# Patient Record
Sex: FEMALE | Race: WHITE | Employment: FULL TIME | URBAN - METROPOLITAN AREA
[De-identification: names, ages, dates, MRNs, and addresses within clinical notes are randomized per-mention and may not be internally consistent; named-entity substitution may affect disease eponyms.]

---

## 2018-11-18 ENCOUNTER — HOSPITAL ENCOUNTER (OUTPATIENT)
Age: 58
Setting detail: OBSERVATION
Discharge: HOME | DRG: 149 | End: 2018-11-19
Attending: EMERGENCY MEDICINE | Admitting: INTERNAL MEDICINE
Payer: COMMERCIAL

## 2018-11-18 ENCOUNTER — APPOINTMENT (OUTPATIENT)
Dept: CT IMAGING | Age: 58
DRG: 149 | End: 2018-11-18
Payer: COMMERCIAL

## 2018-11-18 DIAGNOSIS — R42 DIZZINESS: Primary | ICD-10-CM

## 2018-11-18 LAB
ANION GAP SERPL CALCULATED.3IONS-SCNC: 10 MMOL/L (ref 3–16)
APTT: 29.6 SEC (ref 26–36)
BUN BLDV-MCNC: 14 MG/DL (ref 7–20)
CALCIUM SERPL-MCNC: 8.9 MG/DL (ref 8.3–10.6)
CHLORIDE BLD-SCNC: 102 MMOL/L (ref 99–110)
CHP ED QC CHECK: YES
CO2: 26 MMOL/L (ref 21–32)
CREAT SERPL-MCNC: 0.9 MG/DL (ref 0.6–1.2)
GFR AFRICAN AMERICAN: >60
GFR NON-AFRICAN AMERICAN: >60
GLUCOSE BLD-MCNC: 67 MG/DL
GLUCOSE BLD-MCNC: 67 MG/DL (ref 70–99)
GLUCOSE BLD-MCNC: 74 MG/DL
GLUCOSE BLD-MCNC: 74 MG/DL
GLUCOSE BLD-MCNC: 74 MG/DL (ref 70–99)
GLUCOSE BLD-MCNC: 92 MG/DL (ref 70–99)
HCT VFR BLD CALC: 43.9 % (ref 36–48)
HEMOGLOBIN: 14.4 G/DL (ref 12–16)
INR BLD: 1.01 (ref 0.86–1.14)
MCH RBC QN AUTO: 30.8 PG (ref 26–34)
MCHC RBC AUTO-ENTMCNC: 32.9 G/DL (ref 31–36)
MCV RBC AUTO: 93.7 FL (ref 80–100)
PDW BLD-RTO: 12 % (ref 12.4–15.4)
PERFORMED ON: ABNORMAL
PERFORMED ON: NORMAL
PLATELET # BLD: 155 K/UL (ref 135–450)
PMV BLD AUTO: 8.6 FL (ref 5–10.5)
POTASSIUM SERPL-SCNC: 3.9 MMOL/L (ref 3.5–5.1)
PROTHROMBIN TIME: 11.5 SEC (ref 9.8–13)
RBC # BLD: 4.68 M/UL (ref 4–5.2)
SODIUM BLD-SCNC: 138 MMOL/L (ref 136–145)
WBC # BLD: 3.8 K/UL (ref 4–11)

## 2018-11-18 PROCEDURE — 6370000000 HC RX 637 (ALT 250 FOR IP): Performed by: INTERNAL MEDICINE

## 2018-11-18 PROCEDURE — 2580000003 HC RX 258: Performed by: INTERNAL MEDICINE

## 2018-11-18 PROCEDURE — G0378 HOSPITAL OBSERVATION PER HR: HCPCS

## 2018-11-18 PROCEDURE — 70498 CT ANGIOGRAPHY NECK: CPT

## 2018-11-18 PROCEDURE — 85027 COMPLETE CBC AUTOMATED: CPT

## 2018-11-18 PROCEDURE — 36415 COLL VENOUS BLD VENIPUNCTURE: CPT

## 2018-11-18 PROCEDURE — 80048 BASIC METABOLIC PNL TOTAL CA: CPT

## 2018-11-18 PROCEDURE — 94640 AIRWAY INHALATION TREATMENT: CPT

## 2018-11-18 PROCEDURE — 85610 PROTHROMBIN TIME: CPT

## 2018-11-18 PROCEDURE — 99285 EMERGENCY DEPT VISIT HI MDM: CPT

## 2018-11-18 PROCEDURE — 70450 CT HEAD/BRAIN W/O DYE: CPT

## 2018-11-18 PROCEDURE — 70496 CT ANGIOGRAPHY HEAD: CPT

## 2018-11-18 PROCEDURE — 93005 ELECTROCARDIOGRAM TRACING: CPT | Performed by: EMERGENCY MEDICINE

## 2018-11-18 PROCEDURE — 6370000000 HC RX 637 (ALT 250 FOR IP): Performed by: EMERGENCY MEDICINE

## 2018-11-18 PROCEDURE — 82962 GLUCOSE BLOOD TEST: CPT

## 2018-11-18 PROCEDURE — 6360000004 HC RX CONTRAST MEDICATION: Performed by: EMERGENCY MEDICINE

## 2018-11-18 PROCEDURE — 1200000000 HC SEMI PRIVATE

## 2018-11-18 PROCEDURE — 96374 THER/PROPH/DIAG INJ IV PUSH: CPT

## 2018-11-18 PROCEDURE — 85730 THROMBOPLASTIN TIME PARTIAL: CPT

## 2018-11-18 RX ORDER — ONDANSETRON 2 MG/ML
4 INJECTION INTRAMUSCULAR; INTRAVENOUS EVERY 6 HOURS PRN
Status: DISCONTINUED | OUTPATIENT
Start: 2018-11-18 | End: 2018-11-19 | Stop reason: HOSPADM

## 2018-11-18 RX ORDER — SODIUM CHLORIDE 0.9 % (FLUSH) 0.9 %
10 SYRINGE (ML) INJECTION EVERY 12 HOURS SCHEDULED
Status: DISCONTINUED | OUTPATIENT
Start: 2018-11-18 | End: 2018-11-19 | Stop reason: HOSPADM

## 2018-11-18 RX ORDER — ASPIRIN 81 MG/1
324 TABLET, CHEWABLE ORAL ONCE
Status: COMPLETED | OUTPATIENT
Start: 2018-11-18 | End: 2018-11-18

## 2018-11-18 RX ORDER — LORAZEPAM 0.5 MG/1
0.5 TABLET ORAL
Status: COMPLETED | OUTPATIENT
Start: 2018-11-19 | End: 2018-11-19

## 2018-11-18 RX ORDER — AMLODIPINE BESYLATE 5 MG/1
5 TABLET ORAL DAILY
Status: DISCONTINUED | OUTPATIENT
Start: 2018-11-18 | End: 2018-11-19 | Stop reason: HOSPADM

## 2018-11-18 RX ORDER — ALBUTEROL SULFATE 90 UG/1
2 AEROSOL, METERED RESPIRATORY (INHALATION)
COMMUNITY
Start: 2018-01-31

## 2018-11-18 RX ORDER — DEXTROSE MONOHYDRATE 25 G/50ML
12.5 INJECTION, SOLUTION INTRAVENOUS PRN
Status: DISCONTINUED | OUTPATIENT
Start: 2018-11-18 | End: 2018-11-19 | Stop reason: HOSPADM

## 2018-11-18 RX ORDER — SODIUM CHLORIDE 0.9 % (FLUSH) 0.9 %
10 SYRINGE (ML) INJECTION PRN
Status: DISCONTINUED | OUTPATIENT
Start: 2018-11-18 | End: 2018-11-19 | Stop reason: HOSPADM

## 2018-11-18 RX ORDER — OMEGA-3/DHA/EPA/FISH OIL 60 MG-90MG
1 CAPSULE ORAL DAILY
COMMUNITY

## 2018-11-18 RX ORDER — AMLODIPINE BESYLATE 5 MG/1
5 TABLET ORAL
COMMUNITY
Start: 2018-01-31

## 2018-11-18 RX ORDER — NICOTINE POLACRILEX 4 MG
15 LOZENGE BUCCAL PRN
Status: DISCONTINUED | OUTPATIENT
Start: 2018-11-18 | End: 2018-11-19 | Stop reason: HOSPADM

## 2018-11-18 RX ORDER — DEXTROSE MONOHYDRATE 50 MG/ML
100 INJECTION, SOLUTION INTRAVENOUS PRN
Status: DISCONTINUED | OUTPATIENT
Start: 2018-11-18 | End: 2018-11-19 | Stop reason: HOSPADM

## 2018-11-18 RX ORDER — MECLIZINE HCL 12.5 MG/1
12.5 TABLET ORAL ONCE
Status: COMPLETED | OUTPATIENT
Start: 2018-11-18 | End: 2018-11-18

## 2018-11-18 RX ORDER — ALBUTEROL SULFATE 90 UG/1
2 AEROSOL, METERED RESPIRATORY (INHALATION) EVERY 6 HOURS PRN
Status: DISCONTINUED | OUTPATIENT
Start: 2018-11-18 | End: 2018-11-19 | Stop reason: HOSPADM

## 2018-11-18 RX ORDER — DICLOFENAC SODIUM 75 MG/1
75 TABLET, DELAYED RELEASE ORAL
Status: ON HOLD | COMMUNITY
Start: 2018-11-06 | End: 2018-11-19

## 2018-11-18 RX ORDER — ASCORBIC ACID 500 MG
500 TABLET ORAL DAILY
COMMUNITY

## 2018-11-18 RX ORDER — CETIRIZINE HYDROCHLORIDE 10 MG/1
10 TABLET ORAL DAILY
COMMUNITY
Start: 2013-11-14

## 2018-11-18 RX ORDER — MONTELUKAST SODIUM 10 MG/1
10 TABLET ORAL NIGHTLY
Status: DISCONTINUED | OUTPATIENT
Start: 2018-11-18 | End: 2018-11-19 | Stop reason: HOSPADM

## 2018-11-18 RX ADMIN — AMLODIPINE BESYLATE 5 MG: 5 TABLET ORAL at 15:27

## 2018-11-18 RX ADMIN — ASPIRIN 81 MG CHEWABLE TABLET 324 MG: 81 TABLET CHEWABLE at 10:31

## 2018-11-18 RX ADMIN — MONTELUKAST SODIUM 10 MG: 10 TABLET, COATED ORAL at 20:39

## 2018-11-18 RX ADMIN — IOPAMIDOL 100 ML: 755 INJECTION, SOLUTION INTRAVENOUS at 09:51

## 2018-11-18 RX ADMIN — MOMETASONE FUROATE AND FORMOTEROL FUMARATE DIHYDRATE 2 PUFF: 200; 5 AEROSOL RESPIRATORY (INHALATION) at 20:03

## 2018-11-18 RX ADMIN — MECLIZINE 12.5 MG: 12.5 TABLET ORAL at 10:31

## 2018-11-18 RX ADMIN — Medication 10 ML: at 20:39

## 2018-11-18 ASSESSMENT — PAIN SCALES - GENERAL
PAINLEVEL_OUTOF10: 0

## 2018-11-18 NOTE — H&P
negative    Asthma - stable  -continue home meds    Essential hypertension - stable  -continue home meds      DVT Prophylaxis: Lovenox  Diet: DIET GENERAL;  Code Status: Full Code  PT/OT Eval Status: defer    Dispo - admit to med/surg tele obs       Aliyah Marie MD    Thank you Dallas Shields for the opportunity to be involved in this patient's care. If you have any questions or concerns please feel free to contact me at [phone].

## 2018-11-18 NOTE — PROGRESS NOTES
ADVANCED CARE PLANNING    Isidro Ferrer       :  1960              MRN:  [id number]      Purpose of Encounter: Advanced care planning in light of acute illness and new hospitalization. Parties in attendance: :Sreekanth Carlson MD, Family members: . Decisional Capacity:Yes    Subjective/Patient Story: Patient understands that she has a medical condition that requires hospitalization. Patient wishes to continue further interventions, patient will re-evaluate at a later time: Yes    Objective/Medical Story: Ms. Michelle Starr is a 60 y/o F with hx asthma and HTN who is admitted for dizziness. She is being worked up for an etiology of her vertigo symptoms. She otherwise is fairly healthy and does not have many medical comorbid conditions. She does not frequently seek medical treatment for acute issues. She and her  have mainly had conversations about his wishes as he has many medical comorbid conditions including cardiac issues. However, she says that they have never really had to discuss her medical issues. She is in agreement that she would like resuscitative measures should she need to have them, and she wants to be aggressively treated at this time. Goals of Care Determinations: Patient wishes to focus on returning to her baseline. Plan: Will notify CARINE VACA of change in care plan. Will look at further interventions as needed. Code Status: At this time patient wishes to be Full Code    Time Spent on Advanced Planning Documents: 20 minutes    Advanced Care Planning Documents: Completed advances directives, advanced directives in chart. Electronically signed by Sreekanth Valenzuela MD on 2018 at 4:31 PM  Thank you Betsy Yoon for the opportunity to be involved in this patient's care. If you have any questions or concerns please feel free to contact me at [phone].

## 2018-11-18 NOTE — ED PROVIDER NOTES
POCT Glucose   Result Value Ref Range    Glucose 67 mg/dL    QC OK? yes    POCT Glucose   Result Value Ref Range    POC Glucose 67 (L) 70 - 99 mg/dl    Performed on ACCU-CHEK    POCT Glucose   Result Value Ref Range    Glucose 74 mg/dL    QC OK? yes    POCT Glucose   Result Value Ref Range    POC Glucose 74 70 - 99 mg/dl    Performed on ACCU-CHEK     Glucose 74 mg/dL    QC OK? yes         Radiographs (if obtained):  [] The following radiograph was interpreted by myself in the absence of a radiologist:  [x] Radiologist's Report Reviewed:     CTA NECK W CONTRAST (Final result)   Result time 11/18/18 10:23:02   Final result by Thelma Fishman DO (58/36/63 23:57:27)                Impression:    1.  No evidence of stenosis at levels [id number]. 2.  No stenosis involving proximal or distal cervical internal carotid  arteries or vertebral arteries. 3.  Note made of areas of subsegmental atelectasis, scarring, or fibrosis at  visualized lung apices. Narrative:    EXAMINATION:  CTA OF THE HEAD WITH CONTRAST; CTA OF THE NECK  11/18/2018 10:02 am:    TECHNIQUE:  CTA of the head/brain was performed with the administration of intravenous  contrast. Multiplanar reformatted images are provided for review.  MIP images  are provided for review. Dose modulation, iterative reconstruction, and/or  weight based adjustment of the mA/kV was utilized to reduce the radiation  dose to as low as reasonably achievable.; CTA of the neck was performed with  the administration of intravenous contrast. Multiplanar reformatted images  are provided for review.  MIP images are provided for review. Stenosis of the  internal carotid arteries measured using NASCET criteria. Dose modulation,  iterative reconstruction, and/or weight based adjustment of the mA/kV was  utilized to reduce the radiation dose to as low as reasonably achievable. COMPARISON:  None.     HISTORY:  ORDERING SYSTEM PROVIDED HISTORY: intravenous  contrast. Dose modulation, iterative reconstruction, and/or weight based  adjustment of the mA/kV was utilized to reduce the radiation dose to as low  as reasonably achievable. COMPARISON:  None. HISTORY:  ORDERING SYSTEM PROVIDED HISTORY: dizzy  TECHNOLOGIST PROVIDED HISTORY:  Has a \"code stroke\" or \"stroke alert\" been called? ->Yes  Ordering Physician Provided Reason for Exam: new onset dizziness at 8 am  Acuity: Acute  Type of Exam: Initial    FINDINGS:  BRAIN/VENTRICLES: Mild cerebral atrophy.  Mild periventricular white matter  hypodensity is seen bilaterally.  Well-defined hypodensities are seen at the  basal ganglia bilaterally, suggestive of prominent perivascular spaces or  remote lacunar infarcts.  No evidence of mass effect or midline shift.  No  gross acute hemorrhage. ORBITS: The visualized portion of the orbits demonstrate no acute abnormality. SINUSES: The visualized paranasal sinuses and mastoid air cells demonstrate  no acute abnormality. SOFT TISSUES/SKULL:  No acute abnormality of the visualized skull or soft  tissues. EKG (if obtained): (All EKG's are interpreted by myself in the absence of a cardiologist)  Initial EKG on my interpretation shows NSR w no TASHA. MDM:  Differential diagnosis: CVA, TIA, hypoglycemia    Patient arrives over 6 hours after onset of symptoms less than 24 hours after onset of symptoms so although I do not believe she is a TPA candidate I stroke alerted her under stroke alert algorithm code stroke 2. The patient was emergently taken for CT/CTA after risk and benefits of contrast was discussed with the patient. 9:45am: D/w Dr. Isac Charles of  Stroke: The patient is not a candidate for TPA at this time and the patient's clinical status of no debilitating or impressive objective findings is not consistent with someone who at this time needs emergent transfer for endovascular treatment.    stroke recommends conservative

## 2018-11-19 ENCOUNTER — APPOINTMENT (OUTPATIENT)
Dept: MRI IMAGING | Age: 58
DRG: 149 | End: 2018-11-19
Payer: COMMERCIAL

## 2018-11-19 VITALS
BODY MASS INDEX: 24.19 KG/M2 | DIASTOLIC BLOOD PRESSURE: 77 MMHG | SYSTOLIC BLOOD PRESSURE: 134 MMHG | HEIGHT: 67 IN | TEMPERATURE: 98.1 F | OXYGEN SATURATION: 99 % | RESPIRATION RATE: 18 BRPM | HEART RATE: 51 BPM | WEIGHT: 154.1 LBS

## 2018-11-19 LAB
A/G RATIO: 1.4 (ref 1.1–2.2)
ALBUMIN SERPL-MCNC: 3.7 G/DL (ref 3.4–5)
ALP BLD-CCNC: 70 U/L (ref 40–129)
ALT SERPL-CCNC: 18 U/L (ref 10–40)
ANION GAP SERPL CALCULATED.3IONS-SCNC: 8 MMOL/L (ref 3–16)
AST SERPL-CCNC: 18 U/L (ref 15–37)
BASOPHILS ABSOLUTE: 0 K/UL (ref 0–0.2)
BASOPHILS RELATIVE PERCENT: 0.5 %
BILIRUB SERPL-MCNC: 0.4 MG/DL (ref 0–1)
BUN BLDV-MCNC: 11 MG/DL (ref 7–20)
CALCIUM SERPL-MCNC: 9.4 MG/DL (ref 8.3–10.6)
CHLORIDE BLD-SCNC: 109 MMOL/L (ref 99–110)
CO2: 26 MMOL/L (ref 21–32)
CREAT SERPL-MCNC: 0.7 MG/DL (ref 0.6–1.2)
EKG ATRIAL RATE: 60 BPM
EKG DIAGNOSIS: NORMAL
EKG P AXIS: 67 DEGREES
EKG P-R INTERVAL: 166 MS
EKG Q-T INTERVAL: 422 MS
EKG QRS DURATION: 82 MS
EKG QTC CALCULATION (BAZETT): 422 MS
EKG R AXIS: 49 DEGREES
EKG T AXIS: 55 DEGREES
EKG VENTRICULAR RATE: 60 BPM
EOSINOPHILS ABSOLUTE: 0.1 K/UL (ref 0–0.6)
EOSINOPHILS RELATIVE PERCENT: 1.6 %
GFR AFRICAN AMERICAN: >60
GFR NON-AFRICAN AMERICAN: >60
GLOBULIN: 2.7 G/DL
GLUCOSE BLD-MCNC: 103 MG/DL (ref 70–99)
HCT VFR BLD CALC: 43.9 % (ref 36–48)
HEMOGLOBIN: 14.8 G/DL (ref 12–16)
LYMPHOCYTES ABSOLUTE: 1.8 K/UL (ref 1–5.1)
LYMPHOCYTES RELATIVE PERCENT: 40.9 %
MCH RBC QN AUTO: 31.7 PG (ref 26–34)
MCHC RBC AUTO-ENTMCNC: 33.8 G/DL (ref 31–36)
MCV RBC AUTO: 93.9 FL (ref 80–100)
MONOCYTES ABSOLUTE: 0.5 K/UL (ref 0–1.3)
MONOCYTES RELATIVE PERCENT: 10.8 %
NEUTROPHILS ABSOLUTE: 2 K/UL (ref 1.7–7.7)
NEUTROPHILS RELATIVE PERCENT: 46.2 %
PDW BLD-RTO: 12.8 % (ref 12.4–15.4)
PLATELET # BLD: 151 K/UL (ref 135–450)
PMV BLD AUTO: 8.7 FL (ref 5–10.5)
POTASSIUM REFLEX MAGNESIUM: 4 MMOL/L (ref 3.5–5.1)
RBC # BLD: 4.67 M/UL (ref 4–5.2)
SODIUM BLD-SCNC: 143 MMOL/L (ref 136–145)
TOTAL PROTEIN: 6.4 G/DL (ref 6.4–8.2)
WBC # BLD: 4.4 K/UL (ref 4–11)

## 2018-11-19 PROCEDURE — 97161 PT EVAL LOW COMPLEX 20 MIN: CPT

## 2018-11-19 PROCEDURE — 6370000000 HC RX 637 (ALT 250 FOR IP): Performed by: INTERNAL MEDICINE

## 2018-11-19 PROCEDURE — G8980 MOBILITY D/C STATUS: HCPCS

## 2018-11-19 PROCEDURE — G8978 MOBILITY CURRENT STATUS: HCPCS

## 2018-11-19 PROCEDURE — 85025 COMPLETE CBC W/AUTO DIFF WBC: CPT

## 2018-11-19 PROCEDURE — 70553 MRI BRAIN STEM W/O & W/DYE: CPT

## 2018-11-19 PROCEDURE — 36415 COLL VENOUS BLD VENIPUNCTURE: CPT

## 2018-11-19 PROCEDURE — A9577 INJ MULTIHANCE: HCPCS | Performed by: INTERNAL MEDICINE

## 2018-11-19 PROCEDURE — 2580000003 HC RX 258: Performed by: INTERNAL MEDICINE

## 2018-11-19 PROCEDURE — G0378 HOSPITAL OBSERVATION PER HR: HCPCS

## 2018-11-19 PROCEDURE — 97530 THERAPEUTIC ACTIVITIES: CPT

## 2018-11-19 PROCEDURE — 80053 COMPREHEN METABOLIC PANEL: CPT

## 2018-11-19 PROCEDURE — G8979 MOBILITY GOAL STATUS: HCPCS

## 2018-11-19 PROCEDURE — 6360000002 HC RX W HCPCS: Performed by: INTERNAL MEDICINE

## 2018-11-19 PROCEDURE — 94760 N-INVAS EAR/PLS OXIMETRY 1: CPT

## 2018-11-19 PROCEDURE — 6360000004 HC RX CONTRAST MEDICATION: Performed by: INTERNAL MEDICINE

## 2018-11-19 RX ORDER — MECLIZINE HYDROCHLORIDE 25 MG/1
25 TABLET ORAL 3 TIMES DAILY PRN
Qty: 24 TABLET | Refills: 0 | Status: SHIPPED | OUTPATIENT
Start: 2018-11-19 | End: 2018-11-29

## 2018-11-19 RX ORDER — MECLIZINE HCL 12.5 MG/1
25 TABLET ORAL 3 TIMES DAILY PRN
Status: DISCONTINUED | OUTPATIENT
Start: 2018-11-19 | End: 2018-11-19 | Stop reason: HOSPADM

## 2018-11-19 RX ORDER — LOSARTAN POTASSIUM 100 MG/1
1 TABLET ORAL DAILY
COMMUNITY
Start: 2018-09-21

## 2018-11-19 RX ORDER — PANTOPRAZOLE SODIUM 40 MG/1
1 TABLET, DELAYED RELEASE ORAL DAILY
Refills: 1 | COMMUNITY
Start: 2018-11-13

## 2018-11-19 RX ADMIN — ONDANSETRON 4 MG: 2 INJECTION, SOLUTION INTRAMUSCULAR; INTRAVENOUS at 08:22

## 2018-11-19 RX ADMIN — AMLODIPINE BESYLATE 5 MG: 5 TABLET ORAL at 08:22

## 2018-11-19 RX ADMIN — Medication 10 ML: at 08:23

## 2018-11-19 RX ADMIN — GADOBENATE DIMEGLUMINE 15 ML: 529 INJECTION, SOLUTION INTRAVENOUS at 16:01

## 2018-11-19 RX ADMIN — LORAZEPAM 0.5 MG: 0.5 TABLET ORAL at 14:55

## 2018-11-19 ASSESSMENT — PAIN SCALES - GENERAL: PAINLEVEL_OUTOF10: 0

## 2018-11-19 NOTE — DISCHARGE SUMMARY
PM           Details   meclizine (ANTIVERT) 25 MG tablet Take 1 tablet by mouth 3 times daily as needed for Dizziness, Disp-24 tablet, R-0Print              Details   Glucosamine-Chondroit-Vit C-Mn (GLUCOSAMINE 1500 COMPLEX PO) Take 1 tablet by mouth dailyHistorical Med      losartan (COZAAR) 100 MG tablet Take 1 tablet by mouth dailyHistorical Med      pantoprazole (PROTONIX) 40 MG tablet Take 1 tablet by mouth daily, R-1Historical Med      cetirizine (ZYRTEC) 10 MG tablet Take 10 mg by mouth daily Historical Med      Omega-3 Fatty Acids (FISH OIL) 500 MG CAPS Take 1 capsule by mouth daily Historical Med      Calcium-Ergocalciferol (RA CALCIUM HI-LORETTA/VITAMIN D) 250-125 MG-UNIT TABS Take 1 tablet by mouth daily Historical Med      vitamin C (ASCORBIC ACID) 500 MG tablet Take 500 mg by mouth daily Historical Med      amLODIPine (NORVASC) 5 MG tablet Take 5 mg by mouthHistorical Med      albuterol sulfate HFA (PROAIR HFA) 108 (90 Base) MCG/ACT inhaler Inhale 2 puffs into the lungsHistorical Med      montelukast (SINGULAIR) 10 MG tablet Take 10 mg by mouth nightly. budesonide-formoterol (SYMBICORT) 160-4.5 MCG/ACT AERO Inhale 2 puffs into the lungs 2 times daily. Time Spent on discharge is more than 30 minutes in the examination, evaluation, counseling and review of medications and discharge plan.     Case discussed with Dr. Carla Collins      Signed:    Dang Skaggs MD   11/20/2018

## 2018-11-19 NOTE — PROGRESS NOTES
Physical Therapy     Initial Assessment / Treatment Note / Discharge Note  Room Number: E3M-1451/5103-01     NAME: Anayeli Dotson  : Medical Record Number: [id number]  MRN: [id number]    ASSESSMENT: Chanda Andrade is a 59 y.o. F who presented to [facility] ED 18 with sudden onset of profound vertigo that was triggered by head movement. CTA of the head and neck was negative for any arterial stenosis / occlusion. Physical exam today revealed a negative Torrie-Hallpike bilaterally; patient's symptoms appear to be remitting with time. She is independent with her mobility in the room at this time. I did instruct her in the Epley maneuver for self performance if she is inclined to attempt self treatment. Considering that her exam was non-provacative during this evaluation, I recommend outpatient follow up with ENT if her symptoms do not resolve. Will sign off at this time. Discharge Recommendations: Home independently  Equipment Needs: No    Date of Service: 18       Patient Diagnosis(es): The encounter diagnosis was Dizziness. Past Medical History:  has a past medical history of Asthma; Hypertension; and Pulmonary nodules. Past Surgical History:  has no past surgical history on file.        Vision/Hearing  Vision: Within Functional Limits (denies any diplopia)  Vision Exceptions: Wears glasses for reading  Hearing: Within functional limits (denies any hearing change)    SUBJECTIVE:  Chart Reviewed: Yes  Patient assessed for rehabilitation services?: Yes  Additional Pertinent Hx: Chanda Andrade is a 59 y.o. F who presented to [facility] ED 18 with sudden onset of profound vertigo that was triggered by head movement. CTA of the head and neck was negative for any arterial stenosis / occlusion. Referring Practitioner: CHUY Cohn - [facility]; Corrina Young MD  Diagnosis: Vertigo     Subjective: Pt supine in bed. States that her symptoms are improving significantly over the course of this morning.  Pt pleasant and willing to participate in eval.  Patient Currently in Pain: Denies  Pain Assessment: 0-10  Pain Level: 0  Patient's Stated Pain Goal: No pain     Orientation  Overall Orientation Status: Within Functional Limits  Orientation Level: Oriented X4    Social/Functional History  Social/Functional History  Lives With: Spouse  Type of Home: House  Home Layout: Two level  Home Access: Stairs to enter with rails  Entrance Stairs - Number of Steps: 3 and no rail -- steps up to second floor bedroom. 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       OBJECTIVE:  ROM  RLE PROM: WFL     LLE PROM: WFL       STRENGTH  Strength RLE: WNL  Comment: 5/5 L2-S1 myotomose  RLE Tone: Normotonic (negative De La Torre's and Babinski)  Strength LLE: WNL  Comment: 5/5 L2-S1 myotomose  LLE Tone: Normotonic (negative De La Torre's and Babinski)    Bed mobility     Rolling to Left: Independent  Rolling to Right: Independent  Supine to Sit: Modified independent  Sit to Supine: Modified independent  Scooting: Independent    Transfers  Sit to Stand: Independent  Stand to sit: Independent    Ambulation  Ambulation  Ambulation?: Yes  Ambulation 1  Surface: level tile  Device: No Device  Assistance: Independent  Quality of Gait: slow gait speed, slightly increased stance width, adequate foot clearance and symmetric steps, steady  Distance: small distances in room  Comments: pt able to toe ambulate, heel ambulate and high step without any loss of balance or unsteadiness    Stairs/Curb  Stairs/Curb  Stairs?: No     Balance  Balance  Sitting - Static: Good  Standing - Static: Good    Other Activities:  Comment: VESTIBULAR EXAM: Hegins-Hallpike was negative bilaterally.  I lead patient through Epley maneuver with right sided bias strictly for teaching purposes. I issued a print out of the Epley maneuver and vestibular rehab resources for further management if her symptoms do not resolve. Treatment included transfer training, gait training, and patient education.     This note also serves as a D/C Summary in the event that this patient is discharged prior to the next therapy session. Please refer to last PT note for goal status, discharge recommendations and functional status.       ASSESSMENT:   Assessment: Daisy Saucedo is a 59 y.o. F who presented to [facility] ED 11/18/18 with sudden onset of profound vertigo that was triggered by head movement. CTA of the head and neck was negative for any arterial stenosis / occlusion. Physical exam today revealed a negative Torrie-Hallpike bilaterally; patient's symptoms appear to be remitting with time. She is independent with her mobility in the room at this time. I did instruct her in the Epley maneuver for self performance if she is inclined to attempt self treatment. Considering that her exam was non-provacative during this evaluation, I recommend outpatient follow up with ENT if her symptoms do not resolve. Will sign off at this time. Treatment Diagnosis: Vertigo  Prognosis: Good  Decision Making: Low Complexity  History: Asthma; Hypertension; Pulmonary nodules. Exam: Vertigo  Clinical Presentation: Stable  Patient Education: Role of PT in vestibular treatment, d/c recs. She verb understanding. Barriers to Learning: None  REQUIRES PT FOLLOW UP: Yes  Discharge Recommendations: Home independently    Scotty Murguia scored a 23/24 on the AM-PAC short mobility form. Initial research suggests that an AM-PAC score of 18 or greater may be associated with a discharge to patient's home setting, however this same research reports a standardized positive predictive value of 0.75 indicating that 25% of patients with a score of 18 or greater actually went to a rehab facility.  Based on my clinical

## 2018-11-19 NOTE — CONSULTS
RDW 12.0 (L) 12.4 - 15.4 %    Platelets 811 369 - 895 K/uL    MPV 8.6 5.0 - 10.5 fL   Basic Metabolic Panel    Collection Time: 11/18/18 10:05 AM   Result Value Ref Range    Sodium 138 136 - 145 mmol/L    Potassium 3.9 3.5 - 5.1 mmol/L    Chloride 102 99 - 110 mmol/L    CO2 26 21 - 32 mmol/L    Anion Gap 10 3 - 16    Glucose 92 70 - 99 mg/dL    BUN 14 7 - 20 mg/dL    CREATININE 0.9 0.6 - 1.2 mg/dL    GFR Non-African American >60 >60    GFR African American >60 >60    Calcium 8.9 8.3 - 10.6 mg/dL   Protime-INR    Collection Time: 11/18/18 10:05 AM   Result Value Ref Range    Protime 11.5 9.8 - 13.0 sec    INR 1.01 0.86 - 1.14   APTT    Collection Time: 11/18/18 10:05 AM   Result Value Ref Range    aPTT 29.6 26.0 - 36.0 sec   POCT Glucose    Collection Time: 11/18/18 11:01 AM   Result Value Ref Range    Glucose 74 mg/dL    QC OK? yes    POCT Glucose    Collection Time: 11/18/18 11:01 AM   Result Value Ref Range    POC Glucose 74 70 - 99 mg/dl    Performed on ACCU-CHEK     Glucose 74 mg/dL    QC OK? yes        Scheduled Meds:   amLODIPine  5 mg Oral Daily    mometasone-formoterol  2 puff Inhalation BID    montelukast  10 mg Oral Nightly    sodium chloride flush  10 mL Intravenous 2 times per day    [START ON 11/19/2018] enoxaparin  40 mg Subcutaneous Daily       Continuous Infusions:    dextrose        PRN Meds:    albuterol sulfate HFA 2 puff Q6H PRN   sodium chloride flush 10 mL PRN   magnesium hydroxide 30 mL Daily PRN   ondansetron 4 mg Q6H PRN   glucose 15 g PRN   dextrose 12.5 g PRN   glucagon (rDNA) 1 mg PRN   dextrose 100 mL/hr PRN   [START ON 11/19/2018] LORazepam 0.5 mg Once PRN             Discussed at length with patient; pt's ; and nursing    Lalit Felder M.D.   Neurology  November 18, 2018

## 2020-03-20 ENCOUNTER — APPOINTMENT (OUTPATIENT)
Dept: GENERAL RADIOLOGY | Age: 60
End: 2020-03-20
Payer: COMMERCIAL

## 2020-03-20 ENCOUNTER — HOSPITAL ENCOUNTER (EMERGENCY)
Age: 60
Discharge: HOME | End: 2020-03-20
Attending: EMERGENCY MEDICINE
Payer: COMMERCIAL

## 2020-03-20 VITALS
DIASTOLIC BLOOD PRESSURE: 84 MMHG | RESPIRATION RATE: 16 BRPM | OXYGEN SATURATION: 100 % | WEIGHT: 155 LBS | TEMPERATURE: 97.9 F | HEIGHT: 67 IN | SYSTOLIC BLOOD PRESSURE: 133 MMHG | BODY MASS INDEX: 24.33 KG/M2 | HEART RATE: 56 BPM

## 2020-03-20 LAB
ANION GAP SERPL CALCULATED.3IONS-SCNC: 15 MMOL/L (ref 3–16)
BASOPHILS ABSOLUTE: 0.1 K/UL (ref 0–0.2)
BASOPHILS RELATIVE PERCENT: 0.9 %
BILIRUBIN URINE: NEGATIVE
BLOOD, URINE: NEGATIVE
BUN BLDV-MCNC: 14 MG/DL (ref 7–20)
CALCIUM SERPL-MCNC: 10.1 MG/DL (ref 8.3–10.6)
CHLORIDE BLD-SCNC: 102 MMOL/L (ref 99–110)
CLARITY: CLEAR
CO2: 23 MMOL/L (ref 21–32)
COLOR: YELLOW
CREAT SERPL-MCNC: 0.8 MG/DL (ref 0.6–1.2)
EKG ATRIAL RATE: 78 BPM
EKG DIAGNOSIS: NORMAL
EKG P AXIS: 73 DEGREES
EKG P-R INTERVAL: 160 MS
EKG Q-T INTERVAL: 400 MS
EKG QRS DURATION: 78 MS
EKG QTC CALCULATION (BAZETT): 456 MS
EKG R AXIS: 46 DEGREES
EKG T AXIS: 39 DEGREES
EKG VENTRICULAR RATE: 78 BPM
EOSINOPHILS ABSOLUTE: 0.1 K/UL (ref 0–0.6)
EOSINOPHILS RELATIVE PERCENT: 1.8 %
GFR AFRICAN AMERICAN: >60
GFR NON-AFRICAN AMERICAN: >60
GLUCOSE BLD-MCNC: 119 MG/DL (ref 70–99)
GLUCOSE URINE: NEGATIVE MG/DL
HCT VFR BLD CALC: 45.7 % (ref 36–48)
HEMOGLOBIN: 14.8 G/DL (ref 12–16)
KETONES, URINE: NEGATIVE MG/DL
LEUKOCYTE ESTERASE, URINE: NEGATIVE
LYMPHOCYTES ABSOLUTE: 2.1 K/UL (ref 1–5.1)
LYMPHOCYTES RELATIVE PERCENT: 33.2 %
MAGNESIUM: 2 MG/DL (ref 1.8–2.4)
MCH RBC QN AUTO: 30.1 PG (ref 26–34)
MCHC RBC AUTO-ENTMCNC: 32.4 G/DL (ref 31–36)
MCV RBC AUTO: 92.9 FL (ref 80–100)
MICROSCOPIC EXAMINATION: NORMAL
MONOCYTES ABSOLUTE: 0.5 K/UL (ref 0–1.3)
MONOCYTES RELATIVE PERCENT: 8.3 %
NEUTROPHILS ABSOLUTE: 3.4 K/UL (ref 1.7–7.7)
NEUTROPHILS RELATIVE PERCENT: 55.8 %
NITRITE, URINE: NEGATIVE
PDW BLD-RTO: 11.9 % (ref 12.4–15.4)
PH UA: 7 (ref 5–8)
PLATELET # BLD: 173 K/UL (ref 135–450)
PMV BLD AUTO: 8.3 FL (ref 5–10.5)
POTASSIUM REFLEX MAGNESIUM: 3.7 MMOL/L (ref 3.5–5.1)
PROTEIN UA: NEGATIVE MG/DL
RBC # BLD: 4.92 M/UL (ref 4–5.2)
SODIUM BLD-SCNC: 140 MMOL/L (ref 136–145)
SPECIFIC GRAVITY UA: 1.01 (ref 1–1.03)
TROPONIN: <0.01 NG/ML
URINE REFLEX TO CULTURE: NORMAL
URINE TYPE: NORMAL
UROBILINOGEN, URINE: 0.2 E.U./DL
WBC # BLD: 6.2 K/UL (ref 4–11)

## 2020-03-20 PROCEDURE — 99285 EMERGENCY DEPT VISIT HI MDM: CPT

## 2020-03-20 PROCEDURE — 85025 COMPLETE CBC W/AUTO DIFF WBC: CPT

## 2020-03-20 PROCEDURE — 71046 X-RAY EXAM CHEST 2 VIEWS: CPT

## 2020-03-20 PROCEDURE — 83735 ASSAY OF MAGNESIUM: CPT

## 2020-03-20 PROCEDURE — 93010 ELECTROCARDIOGRAM REPORT: CPT | Performed by: INTERNAL MEDICINE

## 2020-03-20 PROCEDURE — 84484 ASSAY OF TROPONIN QUANT: CPT

## 2020-03-20 PROCEDURE — 81003 URINALYSIS AUTO W/O SCOPE: CPT

## 2020-03-20 PROCEDURE — 93005 ELECTROCARDIOGRAM TRACING: CPT | Performed by: EMERGENCY MEDICINE

## 2020-03-20 PROCEDURE — 36415 COLL VENOUS BLD VENIPUNCTURE: CPT

## 2020-03-20 PROCEDURE — 80048 BASIC METABOLIC PNL TOTAL CA: CPT

## 2020-03-20 ASSESSMENT — ENCOUNTER SYMPTOMS
SHORTNESS OF BREATH: 0
WHEEZING: 0
VOMITING: 0
FACIAL SWELLING: 0
COUGH: 0
PHOTOPHOBIA: 0
TROUBLE SWALLOWING: 0
COLOR CHANGE: 0
CHEST TIGHTNESS: 0
ABDOMINAL PAIN: 0

## 2020-03-20 NOTE — ED NOTES
Returned from [facility] via w/c.      Melany Hernandez RN  03/20/20 [address], RN  03/20/20 [id number]

## 2020-03-20 NOTE — ED PROVIDER NOTES
[facility]  EMERGENCY DEPARTMENTENCOUNTER      Pt Name: Azael Cm  MRN: [id number]  [place] 1960  Date ofevaluation: 3/20/2020  Provider: Julianne Biswas MD    CHIEF COMPLAINT       Chief Complaint   Patient presents with    Palpitations     Pt in bed and started having palpitations. It has happened before but hasn't told pcp yet. Pt has apple watch which told pt she had a-fib. Denies chest pain. HISTORY OF PRESENT ILLNESS   (Location/Symptom, Timing/Onset,Context/Setting, Quality, Duration, Modifying Factors, Severity)  Note limiting factors. Azael Cm is a 62 y.o. female  who  has a past medical history of Asthma, Hypertension, and Pulmonary nodules. who presents to the emergency department for evaluation of palpitations. Patient states that when she went to lay down this evening she felt a sensation that her heart was racing. That she associated chills. Wearing a smart watch and states that the heart monitor function stated that she was in atrial fibrillation. He states that the rate was anywhere between 1 30-1 50. Denies shortness of breath or chest pain. She denies any recent illness but states she has been using her rescue inhaler more frequently. Denies recent travel or pain or swelling to lower extremities. Denies fevers or cough. She states that she has had episodes similar in the past especially after drinking alcohol. She states that she has had 2 episodes this month, which to her seems like things are becoming more frequent. She has not been evaluated for atrial fibrillation in the past.  On presentation patient is symptom-free. She has no complaints. HPI    NursingNotes were reviewed. REVIEW OF SYSTEMS    (2-9 systems for level 4, 10 or more for level 5)     Review of Systems   Constitutional: Negative for activity change, fatigue and fever.    HENT: Negative for congestion, ear pain, facial swelling, mouth sores and trouble swallowing. Eyes: Negative for photophobia and visual disturbance. Respiratory: Negative for cough, chest tightness, shortness of breath and wheezing. Cardiovascular: Negative for chest pain, palpitations and leg swelling. Gastrointestinal: Negative for abdominal pain and vomiting. Genitourinary: Negative for difficulty urinating and frequency. Musculoskeletal: Negative for gait problem and neck pain. Skin: Negative for color change and rash. Neurological: Negative for dizziness, light-headedness and headaches. Psychiatric/Behavioral: Negative for confusion. The patient is not nervous/anxious. All other systems reviewed and are negative. Except as noted above the remainder of the review of systems was reviewed and negative. PAST MEDICAL HISTORY     Past Medical History:   Diagnosis Date    Asthma     Hypertension     Pulmonary nodules          SURGICALHISTORY       Past Surgical History:   Procedure Laterality Date     SECTION           CURRENT MEDICATIONS       Previous Medications    ALBUTEROL SULFATE HFA (PROAIR HFA) 108 (90 BASE) MCG/ACT INHALER    Inhale 2 puffs into the lungs    AMLODIPINE (NORVASC) 5 MG TABLET    Take 5 mg by mouth    BUDESONIDE-FORMOTEROL (SYMBICORT) 160-4.5 MCG/ACT AERO    Inhale 2 puffs into the lungs 2 times daily. CALCIUM-ERGOCALCIFEROL (RA CALCIUM HI-LORETTA/VITAMIN D) 250-125 MG-UNIT TABS    Take 1 tablet by mouth daily     CETIRIZINE (ZYRTEC) 10 MG TABLET    Take 10 mg by mouth daily     GLUCOSAMINE-CHONDROIT-VIT C-MN (GLUCOSAMINE 1500 COMPLEX PO)    Take 1 tablet by mouth daily    LOSARTAN (COZAAR) 100 MG TABLET    Take 1 tablet by mouth daily    MONTELUKAST (SINGULAIR) 10 MG TABLET    Take 10 mg by mouth nightly.     OMEGA-3 FATTY ACIDS (FISH OIL) 500 MG CAPS    Take 1 capsule by mouth daily     PANTOPRAZOLE (PROTONIX) 40 MG TABLET    Take 1 tablet by mouth daily    VITAMIN C (ASCORBIC ACID) 500 MG TABLET    Take 500 mg by mouth daily Codeine    FAMILY HISTORY     History reviewed. No pertinent family history. SOCIAL HISTORY       Social History     Socioeconomic History    Marital status:      Spouse name: None    Number of children: None    Years of education: None    Highest education level: None   Occupational History    None   Social Needs    Financial resource strain: None    Food insecurity     Worry: None     Inability: None    Transportation needs     Medical: None     Non-medical: None   Tobacco Use    Smoking status: Never Smoker    Smokeless tobacco: Never Used   Substance and Sexual Activity    Alcohol use: Yes     Comment: wine in the evening    Drug use: No    Sexual activity: None   Lifestyle    Physical activity     Days per week: None     Minutes per session: None    Stress: None   Relationships    Social connections     Talks on phone: None     Gets together: None     Attends Orthodox service: None     Active member of club or organization: None     Attends meetings of clubs or organizations: None     Relationship status: None    Intimate partner violence     Fear of current or ex partner: None     Emotionally abused: None     Physically abused: None     Forced sexual activity: None   Other Topics Concern    None   Social History Narrative    None       SCREENINGS             PHYSICAL EXAM    (up to 7 for level 4, 8 or more for level 5)     ED Triage Vitals [03/20/20 0126]   BP Temp Temp Source Pulse Resp SpO2 Height Weight   (!) 142/96 97.9 °F (36.6 °C) Oral 74 16 100 % 5' 7\" (1.702 m) 155 lb (70.3 kg)       Physical Exam  Vitals signs and nursing note reviewed. Constitutional:       Appearance: She is well-developed. HENT:      Head: Normocephalic and atraumatic. Mouth/Throat:      Mouth: Mucous membranes are moist.      Pharynx: Oropharynx is clear. No oropharyngeal exudate.    Eyes:      Conjunctiva/sclera: Conjunctivae normal.      Pupils: Pupils are equal, round, and other labs were within normal range or not returned as of this dictation. EMERGENCY DEPARTMENT COURSE and DIFFERENTIAL DIAGNOSIS/MDM:   Vitals:    Vitals:    03/20/20 0126   BP: (!) 142/96   Pulse: 74   Resp: 16   Temp: 97.9 °F (36.6 °C)   TempSrc: Oral   SpO2: 100%   Weight: 155 lb (70.3 kg)   Height: 5' 7\" (1.702 m)       Patient was given thefollowing medications:  Medications - No data to display    ED COURSE & MEDICAL DECISION MAKING    Pertinent Labs & Imaging studies reviewed. (See chart for details)   -  Patient seen and evaluated in the emergency department. -  Triage and nursing notes reviewed and incorporated. -  Old chart records reviewed and incorporated. -  Differential diagnosis includes: .Differential Diagnosis: Acute Coronary Syndrome, Congestive Heart Failure, Myocardial Infarction, Pulmonary Embolus, Pneumonia, Pneumothorax, other  -  Work-up included:  See above  -  ED treatment included: See above  -  Results discussed with patient. Labs show no emergent abnormalities. Imaging studies show no emergent abnormality. Patient remained in a sinus rhythm throughout the entirety of the ED visit. She was asymptomatic during her stay. Patient feels well on re-evaluation. Patient has a chads vas 2 score of 2. Discussed with the patient calling her primary care physician the following day to inform them of her symptoms and to discuss with her she needs anticoagulation or rate control. Strict return precautions discussed with the patient. The patient is agreeable with plan of care and disposition.  -  Disposition:   Discharge      REASSESSMENT      Results were discussed with the patient and why it was of the opinion that the patient was suitable for discharge. Patient is amenable to discharge home. Return indications discussed with the patient. Patient demonstrates understanding of when to return for reevaluation for persistent or worsening symptoms.     CRITICAL CARE TIME   Total Critical